# Patient Record
Sex: FEMALE | Race: WHITE | Employment: FULL TIME | URBAN - METROPOLITAN AREA
[De-identification: names, ages, dates, MRNs, and addresses within clinical notes are randomized per-mention and may not be internally consistent; named-entity substitution may affect disease eponyms.]

---

## 2023-12-12 PROBLEM — D12.6 ADENOMATOUS POLYP OF COLON: Status: ACTIVE | Noted: 2023-12-12

## 2023-12-12 PROBLEM — M25.9 KNEE PROBLEM: Status: ACTIVE | Noted: 2023-12-12

## 2023-12-13 ENCOUNTER — OFFICE VISIT (OUTPATIENT)
Dept: PRIMARY CARE | Facility: CLINIC | Age: 58
End: 2023-12-13
Payer: COMMERCIAL

## 2023-12-13 VITALS
TEMPERATURE: 97 F | OXYGEN SATURATION: 98 % | DIASTOLIC BLOOD PRESSURE: 60 MMHG | HEART RATE: 73 BPM | SYSTOLIC BLOOD PRESSURE: 128 MMHG | WEIGHT: 185 LBS | RESPIRATION RATE: 16 BRPM | HEIGHT: 71 IN | BODY MASS INDEX: 25.9 KG/M2

## 2023-12-13 DIAGNOSIS — Z00.00 ROUTINE GENERAL MEDICAL EXAMINATION AT A HEALTH CARE FACILITY: Primary | ICD-10-CM

## 2023-12-13 DIAGNOSIS — Z23 ENCOUNTER FOR IMMUNIZATION: ICD-10-CM

## 2023-12-13 PROCEDURE — 90686 IIV4 VACC NO PRSV 0.5 ML IM: CPT | Performed by: INTERNAL MEDICINE

## 2023-12-13 PROCEDURE — 1036F TOBACCO NON-USER: CPT | Performed by: INTERNAL MEDICINE

## 2023-12-13 PROCEDURE — 93000 ELECTROCARDIOGRAM COMPLETE: CPT | Performed by: INTERNAL MEDICINE

## 2023-12-13 PROCEDURE — 99396 PREV VISIT EST AGE 40-64: CPT | Performed by: INTERNAL MEDICINE

## 2023-12-13 PROCEDURE — 90471 IMMUNIZATION ADMIN: CPT | Performed by: INTERNAL MEDICINE

## 2023-12-13 ASSESSMENT — ENCOUNTER SYMPTOMS
CONSTIPATION: 0
SLEEP DISTURBANCE: 0
DIARRHEA: 0

## 2023-12-13 ASSESSMENT — PATIENT HEALTH QUESTIONNAIRE - PHQ9
1. LITTLE INTEREST OR PLEASURE IN DOING THINGS: NOT AT ALL
SUM OF ALL RESPONSES TO PHQ9 QUESTIONS 1 AND 2: 0
2. FEELING DOWN, DEPRESSED OR HOPELESS: NOT AT ALL

## 2023-12-13 NOTE — PROGRESS NOTES
"Patient here for a physical    Subjective   Patient ID: Dinora Gracia is a 58 y.o. female who presents for Annual Exam.    The patient is currently following the [employer] Diet, and is pleased with the results so far. She has lost 6 lbs since 12/2022, and intends to achieve a goal weight of 170 lbs.  She denies any bowel problems.    The patient believes her last visit with Obstetrics/Gynecology at [facility] was in 2020.  She intends to follow-up soon with her Specialist to update screening tests.      The patient mentions transient neck pain, likely precipitated by an awkward sleep situation, that subsided after several months.    The patient reports a constant visual floater that seems to be stable and unchanging.  Her last eye exam was in 2022, and she plans to schedule a follow-up with her Specialist soon.    The patient undergoes routine skin checks with Dermatology.     The patient denies any hearing impairment.  She reports good sleep quality.        Review of Systems   HENT:  Negative for hearing loss.    Eyes:         Positive for visual \"floater\".   Gastrointestinal:  Negative for constipation and diarrhea.   Psychiatric/Behavioral:  Negative for sleep disturbance.      Objective   Physical Exam  Constitutional:       Appearance: Normal appearance.   Neck:      Vascular: No carotid bruit.   Cardiovascular:      Rate and Rhythm: Normal rate and regular rhythm.      Heart sounds: Normal heart sounds.   Pulmonary:      Effort: Pulmonary effort is normal.      Breath sounds: Normal breath sounds.   Abdominal:      General: Bowel sounds are normal.      Palpations: Abdomen is soft.      Tenderness: There is no abdominal tenderness.   Skin:     General: Skin is warm and dry.   Neurological:      General: No focal deficit present.      Mental Status: She is alert and oriented to person, place, and time. Mental status is at baseline.   Psychiatric:         Mood and Affect: Mood normal.         Behavior: Behavior " normal.       Assessment/Plan   Problem List Items Addressed This Visit    None  Visit Diagnoses         Codes    Routine general medical examination at a health care facility    -  Primary Z00.00    Relevant Orders    ECG 12 lead (Clinic Performed) (Completed)    Lipid panel    Comprehensive metabolic panel    CBC and Auto Differential    Tsh With Reflex To Free T4 If Abnormal    Encounter for immunization     Z23    Relevant Orders    Flu vaccine (IIV4) age 6 months and greater, preservative free (Completed)            CPE Performed  -  Discussed healthy diet and regular exercise.    -  Physical exam overall unremarkable. Immunizations reviewed and updated accordingly. Healthy lifestyle choices discussed (tobacco avoidance, appropriate alcohol use, avoidance of illicit substances).   -  Patient is wearing seatbelt.   -  Screening lab work ordered as indicated.    -  Age appropriate screening tests reviewed with patient.        EKG unremarkable compared to previous.     IMPRESSIONS/PLAN:     /60 in the office today.  ASCVD 2.5% 12/2023.     Women's Wellness   - Following with Gynecology through [facility].  Encouraged patient to follow-up and update screening tests, and she agreed.       Health Maintenance   - Routine labs ordered including CBC, CMP, and a lipid panel to be completed in the fasting state. Added TSH. Last Mammogram 6/2023. Last colonoscopy 8/2023, repeat due 8/2028.  Patient received Influenza vaccine in the clinic today, tolerated well.  Advised patient to receive Shingrix series vaccine through the pharmacy, and discussed adverse effects.      Follow-up according to routine health maintenance, call sooner if needed.        Scribe Attestation  By signing my name below, IQuynh, Jacekibe   attest that this documentation has been prepared under the direction and in the presence of Russel Cintron DO.   Quynh Read 12/13/23 9:24 AM

## 2024-12-16 ENCOUNTER — APPOINTMENT (OUTPATIENT)
Dept: PRIMARY CARE | Facility: CLINIC | Age: 59
End: 2024-12-16
Payer: COMMERCIAL

## 2024-12-16 VITALS
DIASTOLIC BLOOD PRESSURE: 70 MMHG | HEART RATE: 71 BPM | TEMPERATURE: 97 F | SYSTOLIC BLOOD PRESSURE: 110 MMHG | HEIGHT: 71 IN | WEIGHT: 185 LBS | RESPIRATION RATE: 16 BRPM | BODY MASS INDEX: 25.9 KG/M2 | OXYGEN SATURATION: 97 %

## 2024-12-16 DIAGNOSIS — H93.13 TINNITUS OF BOTH EARS: ICD-10-CM

## 2024-12-16 DIAGNOSIS — Z00.00 HEALTHCARE MAINTENANCE: Primary | ICD-10-CM

## 2024-12-16 DIAGNOSIS — Z00.00 ROUTINE GENERAL MEDICAL EXAMINATION AT A HEALTH CARE FACILITY: ICD-10-CM

## 2024-12-16 DIAGNOSIS — Z12.31 ENCOUNTER FOR SCREENING MAMMOGRAM FOR MALIGNANT NEOPLASM OF BREAST: ICD-10-CM

## 2024-12-16 PROCEDURE — 93000 ELECTROCARDIOGRAM COMPLETE: CPT | Performed by: INTERNAL MEDICINE

## 2024-12-16 PROCEDURE — 99396 PREV VISIT EST AGE 40-64: CPT | Performed by: INTERNAL MEDICINE

## 2024-12-16 PROCEDURE — 1036F TOBACCO NON-USER: CPT | Performed by: INTERNAL MEDICINE

## 2024-12-16 PROCEDURE — 90656 IIV3 VACC NO PRSV 0.5 ML IM: CPT | Performed by: INTERNAL MEDICINE

## 2024-12-16 PROCEDURE — 3008F BODY MASS INDEX DOCD: CPT | Performed by: INTERNAL MEDICINE

## 2024-12-16 PROCEDURE — 90471 IMMUNIZATION ADMIN: CPT | Performed by: INTERNAL MEDICINE

## 2024-12-16 ASSESSMENT — ENCOUNTER SYMPTOMS
ABDOMINAL PAIN: 0
BLOOD IN STOOL: 0
PALPITATIONS: 0
CONSTIPATION: 0
DIARRHEA: 0

## 2024-12-16 ASSESSMENT — PATIENT HEALTH QUESTIONNAIRE - PHQ9
SUM OF ALL RESPONSES TO PHQ9 QUESTIONS 1 AND 2: 0
1. LITTLE INTEREST OR PLEASURE IN DOING THINGS: NOT AT ALL
2. FEELING DOWN, DEPRESSED OR HOPELESS: NOT AT ALL

## 2024-12-16 NOTE — PROGRESS NOTES
Patient here for a physical     Subjective   Patient ID: Dinora Gracia is a 59 y.o. female who presents for Annual Exam.    The patient mentions longstanding bilateral tinnitus which seems to be progressing slowly as time goes on.  This is now associated with mild hearing impairment as she is unable to hear higher pitches over the tinnitus.      The patient believes she is up to date with pap screening tests, and recalls that she is to repeat testing every five years.      The patient denies any palpitations, chest pressure, chest pain, abdominal pain, hematochezia, melena, or bowel problems.     The patient's parents both passed away while in their 90s.  Her mother had early signs of dementia and passed away from acute respiratory symptoms.  The patient's father had dementia, childhood hearing loss, and valvular heart disease.      Review of Systems   HENT:  Positive for hearing loss and tinnitus.    Cardiovascular:  Negative for chest pain and palpitations.   Gastrointestinal:  Negative for abdominal pain, blood in stool, constipation and diarrhea.   All other systems reviewed and are negative.    Objective   Physical Exam  Constitutional:       Appearance: Normal appearance.   Neck:      Vascular: No carotid bruit.   Cardiovascular:      Rate and Rhythm: Normal rate and regular rhythm.      Heart sounds: Normal heart sounds.   Pulmonary:      Effort: Pulmonary effort is normal.      Breath sounds: Normal breath sounds.   Abdominal:      General: Bowel sounds are normal.      Palpations: Abdomen is soft.      Tenderness: There is no abdominal tenderness.   Skin:     General: Skin is warm and dry.   Neurological:      General: No focal deficit present.      Mental Status: She is alert and oriented to person, place, and time. Mental status is at baseline.   Psychiatric:         Mood and Affect: Mood normal.         Behavior: Behavior normal.         Assessment/Plan   Problem List Items Addressed This Visit     None  Visit Diagnoses         Codes    Routine general medical examination at a health care facility    -  Primary Z00.00    Relevant Orders    ECG 12 lead (Clinic Performed) (Completed)    Encounter for screening mammogram for malignant neoplasm of breast     Z12.31    Relevant Orders    BI mammo bilateral screening tomosynthesis    Healthcare maintenance     Z00.00    Relevant Orders    Lipid panel    CBC and Auto Differential    Comprehensive metabolic panel    Tsh With Reflex To Free T4 If Abnormal    Tinnitus of both ears     H93.13    Relevant Orders    Referral to Audiology            CPE Performed  -  Discussed healthy diet and regular exercise.    -  Physical exam overall unremarkable. Immunizations reviewed and updated accordingly. Healthy lifestyle choices discussed (tobacco avoidance, appropriate alcohol use, avoidance of illicit substances).   -  Patient is wearing seatbelt.   -  Screening lab work ordered as indicated.    -  Age appropriate screening tests reviewed with patient.        IMPRESSIONS/PLAN:     Women's Wellness   - Following with Gynecology through [facility].  Encouraged patient to follow-up and update screening tests, and she agreed.      Bilateral Tinnitus  - Ordered referral to Audiology.    /70 in the office today.  ASCVD 2.8% 12/2024.     Health Maintenance   - Routine labs ordered including CBC, CMP, and a lipid panel to be completed in the fasting state. Added TSH. Last Mammogram 6/2023. Last colonoscopy 8/2023, repeat due 8/2028.  Patient received Influenza vaccine in the clinic today, tolerated well.  Advised the patient to receive the Shingrix series through the pharmacy, and discussed adverse effects.       Follow-up according to routine health maintenance, call sooner if needed.        Scribe Attestation  By signing my name below, IQuynh Scribe   attest that this documentation has been prepared under the direction and in the presence of Russel Cintron DO.    Quynh Read 12/16/24 8:09 AM

## 2024-12-31 ENCOUNTER — LAB (OUTPATIENT)
Dept: LAB | Facility: LAB | Age: 59
End: 2024-12-31
Payer: COMMERCIAL

## 2024-12-31 DIAGNOSIS — Z00.00 HEALTHCARE MAINTENANCE: ICD-10-CM

## 2024-12-31 LAB
ALBUMIN SERPL BCP-MCNC: 4.4 G/DL (ref 3.4–5)
ALP SERPL-CCNC: 104 U/L (ref 33–110)
ALT SERPL W P-5'-P-CCNC: 15 U/L (ref 7–45)
ANION GAP SERPL CALC-SCNC: 14 MMOL/L (ref 10–20)
AST SERPL W P-5'-P-CCNC: 15 U/L (ref 9–39)
BASOPHILS # BLD AUTO: 0.08 X10*3/UL (ref 0–0.1)
BASOPHILS NFR BLD AUTO: 1.7 %
BILIRUB SERPL-MCNC: 0.7 MG/DL (ref 0–1.2)
BUN SERPL-MCNC: 14 MG/DL (ref 6–23)
CALCIUM SERPL-MCNC: 9.7 MG/DL (ref 8.6–10.6)
CHLORIDE SERPL-SCNC: 101 MMOL/L (ref 98–107)
CHOLEST SERPL-MCNC: 284 MG/DL (ref 0–199)
CHOLESTEROL/HDL RATIO: 3.7
CO2 SERPL-SCNC: 31 MMOL/L (ref 21–32)
CREAT SERPL-MCNC: 0.81 MG/DL (ref 0.5–1.05)
EGFRCR SERPLBLD CKD-EPI 2021: 84 ML/MIN/1.73M*2
EOSINOPHIL # BLD AUTO: 0.19 X10*3/UL (ref 0–0.7)
EOSINOPHIL NFR BLD AUTO: 4.1 %
ERYTHROCYTE [DISTWIDTH] IN BLOOD BY AUTOMATED COUNT: 12.5 % (ref 11.5–14.5)
GLUCOSE SERPL-MCNC: 91 MG/DL (ref 74–99)
HCT VFR BLD AUTO: 38.9 % (ref 36–46)
HDLC SERPL-MCNC: 76.7 MG/DL
HGB BLD-MCNC: 13.4 G/DL (ref 12–16)
IMM GRANULOCYTES # BLD AUTO: 0.01 X10*3/UL (ref 0–0.7)
IMM GRANULOCYTES NFR BLD AUTO: 0.2 % (ref 0–0.9)
LDLC SERPL CALC-MCNC: 169 MG/DL
LYMPHOCYTES # BLD AUTO: 1.65 X10*3/UL (ref 1.2–4.8)
LYMPHOCYTES NFR BLD AUTO: 36 %
MCH RBC QN AUTO: 30.9 PG (ref 26–34)
MCHC RBC AUTO-ENTMCNC: 34.4 G/DL (ref 32–36)
MCV RBC AUTO: 90 FL (ref 80–100)
MONOCYTES # BLD AUTO: 0.44 X10*3/UL (ref 0.1–1)
MONOCYTES NFR BLD AUTO: 9.6 %
NEUTROPHILS # BLD AUTO: 2.21 X10*3/UL (ref 1.2–7.7)
NEUTROPHILS NFR BLD AUTO: 48.4 %
NON HDL CHOLESTEROL: 207 MG/DL (ref 0–149)
NRBC BLD-RTO: 0 /100 WBCS (ref 0–0)
PLATELET # BLD AUTO: 294 X10*3/UL (ref 150–450)
POTASSIUM SERPL-SCNC: 4.7 MMOL/L (ref 3.5–5.3)
PROT SERPL-MCNC: 6.9 G/DL (ref 6.4–8.2)
RBC # BLD AUTO: 4.33 X10*6/UL (ref 4–5.2)
SODIUM SERPL-SCNC: 141 MMOL/L (ref 136–145)
T4 FREE SERPL-MCNC: 1.12 NG/DL (ref 0.78–1.48)
TRIGL SERPL-MCNC: 193 MG/DL (ref 0–149)
TSH SERPL-ACNC: 5 MIU/L (ref 0.44–3.98)
VLDL: 39 MG/DL (ref 0–40)
WBC # BLD AUTO: 4.6 X10*3/UL (ref 4.4–11.3)

## 2024-12-31 PROCEDURE — 84443 ASSAY THYROID STIM HORMONE: CPT

## 2024-12-31 PROCEDURE — 84439 ASSAY OF FREE THYROXINE: CPT

## 2024-12-31 PROCEDURE — 85025 COMPLETE CBC W/AUTO DIFF WBC: CPT

## 2024-12-31 PROCEDURE — 80061 LIPID PANEL: CPT

## 2024-12-31 PROCEDURE — 80053 COMPREHEN METABOLIC PANEL: CPT

## 2025-01-02 DIAGNOSIS — R79.89 ABNORMAL TSH: Primary | ICD-10-CM

## 2025-01-08 ENCOUNTER — APPOINTMENT (OUTPATIENT)
Dept: AUDIOLOGY | Facility: CLINIC | Age: 60
End: 2025-01-08
Payer: COMMERCIAL

## 2025-01-20 ENCOUNTER — HOSPITAL ENCOUNTER (OUTPATIENT)
Dept: RADIOLOGY | Facility: CLINIC | Age: 60
Discharge: HOME | End: 2025-01-20
Payer: COMMERCIAL

## 2025-01-20 VITALS — WEIGHT: 185 LBS | BODY MASS INDEX: 26.48 KG/M2 | HEIGHT: 70 IN

## 2025-01-20 DIAGNOSIS — Z12.31 ENCOUNTER FOR SCREENING MAMMOGRAM FOR MALIGNANT NEOPLASM OF BREAST: ICD-10-CM

## 2025-01-20 PROCEDURE — 77063 BREAST TOMOSYNTHESIS BI: CPT | Performed by: STUDENT IN AN ORGANIZED HEALTH CARE EDUCATION/TRAINING PROGRAM

## 2025-01-20 PROCEDURE — 77067 SCR MAMMO BI INCL CAD: CPT | Performed by: STUDENT IN AN ORGANIZED HEALTH CARE EDUCATION/TRAINING PROGRAM

## 2025-01-20 PROCEDURE — 77063 BREAST TOMOSYNTHESIS BI: CPT

## 2025-01-22 ENCOUNTER — CLINICAL SUPPORT (OUTPATIENT)
Dept: AUDIOLOGY | Facility: CLINIC | Age: 60
End: 2025-01-22
Payer: COMMERCIAL

## 2025-01-22 DIAGNOSIS — H90.3 SENSORINEURAL HEARING LOSS (SNHL) OF BOTH EARS: Primary | ICD-10-CM

## 2025-01-22 DIAGNOSIS — H93.13 TINNITUS OF BOTH EARS: ICD-10-CM

## 2025-01-22 PROCEDURE — 92557 COMPREHENSIVE HEARING TEST: CPT

## 2025-01-22 PROCEDURE — 92567 TYMPANOMETRY: CPT

## 2025-01-22 ASSESSMENT — PAIN SCALES - GENERAL: PAINLEVEL_OUTOF10: 0 - NO PAIN

## 2025-01-22 ASSESSMENT — PAIN - FUNCTIONAL ASSESSMENT: PAIN_FUNCTIONAL_ASSESSMENT: 0-10

## 2025-01-22 NOTE — PROGRESS NOTES
AUDIOLOGIC EVALUATION  Name: Dinora Gracia  YOB: 1965  MRN: [id number]  Age: 59 y.o.    Date of Evaluation:  1/22/2025    History:  Dinora Gracia, 59 y.o., was seen today for a hearing evaluation on order from Russel Cintron DO. The patient reported constant tinnitus for many years that has increased in volume. She noted that she has a difficult time understanding speakers who have a high-pitched voice, who mumble, and those who speak softly. She denied otalgia, dizziness, previous otologic surgery and history of noise exposure.     Evaluation:    Otoscopy  Clear canals bilaterally    Tympanometry  Right ear: Type A, normal ear canal volume and compliance.  Left ear: Type A, normal ear canal volume and compliance.     Acoustic Reflexes  Right ear: Could not maintain seal  Left ear: Could not maintain seal    Audiometric Evaluation  Right ear: normal hearing through 1000 Hz sloping to a mild sensorineural hearing loss. Word recognition ability estimated to be excellent(96%) at 55 dB HL based on an NU-6 recorded 25-word list.  Left ear: normal hearing through 1500 Hz sloping to a mild to moderate sensorineural hearing loss. Word recognition ability estimated to be excellent(100%) at 60 dB HL based on an NU-6 recorded 25-word list.    The test results were discussed with the patient.     Impressions  Today's evaluation revealed normal hearing sloping to a mild sensorineural hearing loss in the right ear and normal hearing sloping to a mild to moderate sensorineural hearing loss in the left ear. Word recognition abilities were measured to be excellent bilaterally. Tympanograms were type A (normal) in both ears.    The link between hearing loss and tinnitus was discussed. The patient was encouraged to consider the use of hearing aids to treat their hearing loss and make their tinnitus less bothersome. They were encouraged to contact their insurance provider to inquire about a possible hearing aid  benefit and where it can be used. If they do not have a hearing aid benefit or wish to obtain hearing aids through our center, they were encouraged to schedule a hearing aid consult appointment at their earliest convenience.     The patient was given a copy of today's audiogram with these recommendations.     Recommendations  - Continue medical follow-up with established providers   - Re-test hearing annually  - Consider binaural amplification  - Consider the use of noise maskers in your environment to help your tinnitus become less noticeable    Time: 8666-7954    ZORAIDA Damon, CCC-A  Licensed Audiologist

## 2025-01-22 NOTE — LETTER
January 22, 2025     Russel Cintron DO  [address]  [facility]  [place] [address]    Patient: Dinora Gracia   YOB: 1965   Date of Visit: 1/22/2025       Dear Dr. Russel Cintron DO:    Thank you for referring Dinora Gracia to me for evaluation. Below are my notes for this consultation.  If you have questions, please do not hesitate to call me. I look forward to following your patient along with you.       Sincerely,     Brianna Quijano, ZORAIDA, CCC-A      CC: No Recipients  ______________________________________________________________________________________    AUDIOLOGIC EVALUATION  Name: Dinora Gracia  YOB: 1965  MRN: [id number]  Age: 59 y.o.    Date of Evaluation:  1/22/2025    History:  Dinora Gracia, 59 y.o., was seen today for a hearing evaluation on order from Russel Cintron DO. The patient reported constant tinnitus for many years that has increased in volume. She noted that she has a difficult time understanding speakers who have a high-pitched voice, who mumble, and those who speak softly. She denied otalgia, dizziness, previous otologic surgery and history of noise exposure.     Evaluation:    Otoscopy  Clear canals bilaterally    Tympanometry  Right ear: Type A, normal ear canal volume and compliance.  Left ear: Type A, normal ear canal volume and compliance.     Acoustic Reflexes  Right ear: Could not maintain seal  Left ear: Could not maintain seal    Audiometric Evaluation  Right ear: normal hearing through 1000 Hz sloping to a mild sensorineural hearing loss. Word recognition ability estimated to be excellent(96%) at 55 dB HL based on an NU-6 recorded 25-word list.  Left ear: normal hearing through 1500 Hz sloping to a mild to moderate sensorineural hearing loss. Word recognition ability estimated to be excellent(100%) at 60 dB HL based on an NU-6 recorded 25-word list.    The test results were discussed with the patient.      Impressions  Today's evaluation revealed normal hearing sloping to a mild sensorineural hearing loss in the right ear and normal hearing sloping to a mild to moderate sensorineural hearing loss in the left ear. Word recognition abilities were measured to be excellent bilaterally. Tympanograms were type A (normal) in both ears.    The link between hearing loss and tinnitus was discussed. The patient was encouraged to consider the use of hearing aids to treat their hearing loss and make their tinnitus less bothersome. They were encouraged to contact their insurance provider to inquire about a possible hearing aid benefit and where it can be used. If they do not have a hearing aid benefit or wish to obtain hearing aids through our center, they were encouraged to schedule a hearing aid consult appointment at their earliest convenience.     The patient was given a copy of today's audiogram with these recommendations.     Recommendations  - Continue medical follow-up with established providers   - Re-test hearing annually  - Consider binaural amplification  - Consider the use of noise maskers in your environment to help your tinnitus become less noticeable    Time: 1058-6667    ZORAIDA Damon, CCC-A  Licensed Audiologist

## 2025-12-15 ENCOUNTER — APPOINTMENT (OUTPATIENT)
Dept: PRIMARY CARE | Facility: CLINIC | Age: 60
End: 2025-12-15
Payer: COMMERCIAL